# Patient Record
Sex: FEMALE | Race: WHITE | NOT HISPANIC OR LATINO | URBAN - METROPOLITAN AREA
[De-identification: names, ages, dates, MRNs, and addresses within clinical notes are randomized per-mention and may not be internally consistent; named-entity substitution may affect disease eponyms.]

---

## 2017-07-10 ENCOUNTER — OFFICE VISIT - RIVER FALLS (OUTPATIENT)
Dept: FAMILY MEDICINE | Facility: CLINIC | Age: 52
End: 2017-07-10

## 2017-07-10 ASSESSMENT — MIFFLIN-ST. JEOR: SCORE: 1214.81

## 2017-11-20 ENCOUNTER — OFFICE VISIT - RIVER FALLS (OUTPATIENT)
Dept: FAMILY MEDICINE | Facility: CLINIC | Age: 52
End: 2017-11-20

## 2017-11-20 ASSESSMENT — MIFFLIN-ST. JEOR: SCORE: 1285.57

## 2019-06-19 ENCOUNTER — OFFICE VISIT - RIVER FALLS (OUTPATIENT)
Dept: FAMILY MEDICINE | Facility: CLINIC | Age: 54
End: 2019-06-19

## 2019-06-19 LAB
ALBUMIN UR-MCNC: NEGATIVE G/DL
BILIRUB UR QL STRIP: NEGATIVE
GLUCOSE UR STRIP-MCNC: NEGATIVE MG/DL
HGB UR QL STRIP: NEGATIVE
KETONES UR STRIP-MCNC: NEGATIVE MG/DL
LEUKOCYTE ESTERASE UR QL STRIP: NEGATIVE
NITRATE UR QL: NEGATIVE
PH UR STRIP: 7 [PH] (ref 5–8)
SP GR UR STRIP: 1.01 (ref 1–1.03)

## 2019-06-19 ASSESSMENT — MIFFLIN-ST. JEOR: SCORE: 1282.11

## 2020-08-11 ENCOUNTER — OFFICE VISIT - RIVER FALLS (OUTPATIENT)
Dept: FAMILY MEDICINE | Facility: CLINIC | Age: 55
End: 2020-08-11

## 2020-08-11 ENCOUNTER — COMMUNICATION - RIVER FALLS (OUTPATIENT)
Dept: FAMILY MEDICINE | Facility: CLINIC | Age: 55
End: 2020-08-11

## 2020-08-11 ASSESSMENT — MIFFLIN-ST. JEOR: SCORE: 1209.82

## 2020-08-12 ENCOUNTER — COMMUNICATION - RIVER FALLS (OUTPATIENT)
Dept: FAMILY MEDICINE | Facility: CLINIC | Age: 55
End: 2020-08-12

## 2020-08-12 LAB
CHOLEST SERPL-MCNC: 212 MG/DL
CHOLEST/HDLC SERPL: 3.2 {RATIO}
HDLC SERPL-MCNC: 67 MG/DL
LDLC SERPL CALC-MCNC: 127 MG/DL
NONHDLC SERPL-MCNC: 145 MG/DL
TRIGL SERPL-MCNC: 83 MG/DL

## 2021-07-05 ENCOUNTER — OFFICE VISIT - RIVER FALLS (OUTPATIENT)
Dept: FAMILY MEDICINE | Facility: CLINIC | Age: 56
End: 2021-07-05

## 2021-07-06 ASSESSMENT — MIFFLIN-ST. JEOR: SCORE: 1143.59

## 2021-07-09 LAB
ALBUMIN UR-MCNC: NEGATIVE G/DL
APPEARANCE UR: CLEAR
BILIRUB UR QL STRIP: NEGATIVE
COLOR UR AUTO: YELLOW
GLUCOSE UR STRIP-MCNC: NEGATIVE MG/DL
HGB UR QL STRIP: NEGATIVE
KETONES UR STRIP-MCNC: NEGATIVE MG/DL
LEUKOCYTE ESTERASE UR QL STRIP: NEGATIVE
NITRATE UR QL: NEGATIVE
PH UR STRIP: 6.5 [PH]
SP GR UR STRIP: 1.03
UROBILINOGEN UR STRIP-MCNC: NORMAL MG/DL

## 2022-02-11 VITALS
DIASTOLIC BLOOD PRESSURE: 60 MMHG | HEIGHT: 65 IN | WEIGHT: 138.2 LBS | HEART RATE: 68 BPM | SYSTOLIC BLOOD PRESSURE: 106 MMHG | BODY MASS INDEX: 23.03 KG/M2 | TEMPERATURE: 97.7 F

## 2022-02-11 VITALS
HEIGHT: 64 IN | TEMPERATURE: 97.4 F | RESPIRATION RATE: 16 BRPM | WEIGHT: 126 LBS | SYSTOLIC BLOOD PRESSURE: 118 MMHG | DIASTOLIC BLOOD PRESSURE: 68 MMHG | BODY MASS INDEX: 21.51 KG/M2 | HEART RATE: 60 BPM

## 2022-02-11 VITALS
HEART RATE: 54 BPM | OXYGEN SATURATION: 97 % | HEIGHT: 64 IN | TEMPERATURE: 97.3 F | DIASTOLIC BLOOD PRESSURE: 72 MMHG | WEIGHT: 140.6 LBS | SYSTOLIC BLOOD PRESSURE: 122 MMHG | BODY MASS INDEX: 24.01 KG/M2

## 2022-02-11 VITALS
OXYGEN SATURATION: 96 % | WEIGHT: 153.04 LBS | HEART RATE: 65 BPM | SYSTOLIC BLOOD PRESSURE: 120 MMHG | HEIGHT: 65 IN | DIASTOLIC BLOOD PRESSURE: 72 MMHG | BODY MASS INDEX: 25.5 KG/M2 | TEMPERATURE: 97.2 F

## 2022-02-11 VITALS
DIASTOLIC BLOOD PRESSURE: 82 MMHG | TEMPERATURE: 97.1 F | SYSTOLIC BLOOD PRESSURE: 121 MMHG | HEIGHT: 65 IN | HEART RATE: 60 BPM | WEIGHT: 153.8 LBS | BODY MASS INDEX: 25.62 KG/M2

## 2022-02-15 NOTE — LETTER
(Inserted Image. Unable to display)   August 12, 2020      VERONICA HERMILA      [address]  [place], [place] [id number]        Dear VERONICA,    Thank you for selecting [facility] for your healthcare needs.  Below you will find the results of the recent tests done at our clinic.      Your lab results are excellent, Tressa. Cholesterol and other lipid levels help determine your risk for developing heart and blood vessel disease.  A normal adult Total Cholesterol should be under 200, the LDL (bad cholesterol) should be under 130 and is most influenced by dietary intake of cholesterol.   The HDL (good cholesterol) should be over 45 can be influenced by exercise.  The triglyceride level is another type of fat and should be under 150.  Try to avoid saturated fats and trans fats in your diet. So nice to see you.         Result Name Current Result Reference Range   Cholesterol (mg/dL) ((H)) 212 8/11/2020  - <200   Non-HDL Cholesterol ((H)) 145 8/11/2020  - <130   HDL (mg/dL)  67 8/11/2020 > OR = 50 -    Cholesterol/HDL Ratio  3.2 8/11/2020  - <5.0   LDL ((H)) 127 8/11/2020    Triglyceride (mg/dL)  83 8/11/2020  - <150       Please contact me or my assistant at [phone] if you have any questions or concerns.     Sincerely,        COTY Kaye-NP  Family Nurse Practitioner      What do your labs mean?  Below is a glossary of commonly ordered labs:  LDL   Bad Cholesterol   HDL   Good Cholesterol  AST/ALT   Liver Function   Cr/Creatinine   Kidney Function  Microalbumin   Kidney Function  BUN   Kidney Function  PSA   Prostate    TSH   Thyroid Hormone  HgbA1c   Diabetes Test   Hgb (Hemoglobin)   Red Blood Cells  WBC   White Blood Cell Count

## 2022-02-15 NOTE — PROGRESS NOTES
Patient:   VERONICA CLEANING            MRN: [id number]            FIN: [id number]               Age:   56 years     Sex:  Female     :  1965   Associated Diagnoses:   Encounter for examination required by Department of Transportation (DOT)   Author:   Urbano ZAMORA, Colt RIVERS      Chief Complaint   2021 11:37 AM CDT    Pt here for a DOT Px.        Subjective   Chief complaint 2021 11:37 AM CDT    Pt here for a DOT Px.   DOT exam.     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 [employer]      Health Status   Allergies:    Allergic Reactions (Selected)  No Known Medication Allergies   Medications:  (Selected)   , not on any regular medications   Problem list:    All Problems  Tobacco abuse / ICD-9-.1 / Confirmed      Objective         Vital Signs   2021 11:37 AM CDT Temperature Tympanic 97.4 DegF  LOW    Peripheral Pulse Rate 60 bpm    Pulse Site Radial artery    Respiratory Rate 16 br/min    Systolic Blood Pressure 118 mmHg    Diastolic Blood Pressure 68 mmHg    Mean Arterial Pressure 85 mmHg    BP Site Right arm      Measurements from flowsheet : Measurements   2021 11:37 AM CDT Height Measured - Standard 63.5 in    Weight Measured - Standard 126 lb    BSA 1.6 m2    Body Mass Index 21.97 kg/m2      General:  No acute distress.    Eye:  Pupils are equal, round and reactive to light, Extraocular movements are intact, visual acuity is normal, horizontal field of vision is 90 degrees bilaterally.    HENT:  Tympanic membranes are clear, Normal hearing, No pharyngeal erythema, No sinus tenderness.    Neck:  Supple, Non-tender, No lymphadenopathy.    Respiratory:  Lungs are clear to auscultation.    Cardiovascular:  Normal rate, Regular rhythm, No murmur.    Gastrointestinal:  Soft, Non-tender, Non-distended, Normal bowel sounds, No organomegaly.    Musculoskeletal:  Normal range of motion.    Neurologic:  Cranial Nerves II-XII are grossly intact, Normal deep tendon reflexes, Romberg  is negative.    Psychiatric:  Appropriate mood & affect.       Results Review   Results review   Urinalysis is normal      Impression and Plan   Assessment and Plan:          Diagnosis: Encounter for examination required by Department of Transportation (DOT) (CCA99-QW Z02.89).    Orders     Orders   Charges:  9939D DOT Exam (Charge) (Order): Quantity: 1, Encounter for examination required by Department of Transportation (DOT).     Normal DOT exam; Approved for 2 years without restrictions.     .

## 2022-02-15 NOTE — NURSING NOTE
Comprehensive Intake Entered On:  8/11/2020 8:42 AM CDT    Performed On:  8/11/2020 8:35 AM CDT by Madonna Escobar LPN               Summary   Chief Complaint :   pre-op exam; having breast implants removed and then having a breast lift with Dr Sylvie Araiza on 8/28/20, fax# [phone]   Weight Measured :   140.6 lb(Converted to: 140 lb 10 oz, 63.78 kg)    Height Measured :   63.5 in(Converted to: 5 ft 3 in, 161.29 cm)    Body Mass Index :   24.51 kg/m2   Body Surface Area :   1.69 m2   Systolic Blood Pressure :   122 mmHg   Diastolic Blood Pressure :   72 mmHg   Mean Arterial Pressure :   89 mmHg   Peripheral Pulse Rate :   54 bpm (LOW)    BP Site :   Right arm   BP Method :   Manual   Temperature Tympanic :   97.3 DegF(Converted to: 36.3 DegC)  (LOW)    Oxygen Saturation :   97 %   Madonna Escobar LPN - 8/11/2020 8:35 AM CDT   Health Status   Allergies Verified? :   Yes   Medication History Verified? :   Yes   Medical History Verified? :   No   Pre-Visit Planning Status :   Completed   Tobacco Use? :   Former smoker   Madonna Escobar LPN - 8/11/2020 8:35 AM CDT   Meds / Allergies   (As Of: 8/11/2020 8:42:34 AM CDT)   Allergies (Active)   No Known Medication Allergies  Estimated Onset Date:   Unspecified ; Created By:   Madonna Escobar LPN; Reaction Status:   Active ; Category:   Drug ; Substance:   No Known Medication Allergies ; Type:   Allergy ; Updated By:   Madonna Escobar LPN; Reviewed Date:   8/11/2020 8:39 AM CDT        Medication List   (As Of: 8/11/2020 8:42:34 AM CDT)        ID Risk Screen   Recent Travel History :   No recent travel   Family Member Travel History :   No recent travel   Other Exposure to Infectious Disease :   Unknown   Madonna Escobar LPN - 8/11/2020 8:35 AM CDT

## 2022-02-15 NOTE — LETTER
(Inserted Image. Unable to display)   August 11, 2020      VERONICA HERMILA      [address]  [place], [place] [id number]        Dear VERONICA,    Thank you for selecting [facility] for your healthcare needs.  Below you will find the results of the recent tests done at our clinic.      Hemoglobin is excellent, Tressa, the cholesterol test will be back at the end of the week, thank you.      Result Name Current Result Reference Range   Hgb (g/dL)  13.2 8/11/2020 12.0 - 16.0   MCV (fL)  93 8/11/2020 80 - 100       Please contact me or my assistant at [phone] if you have any questions or concerns.     Sincerely,        COTY Kaye-NP  Family Nurse Practitioner      What do your labs mean?  Below is a glossary of commonly ordered labs:  LDL   Bad Cholesterol   HDL   Good Cholesterol  AST/ALT   Liver Function   Cr/Creatinine   Kidney Function  Microalbumin   Kidney Function  BUN   Kidney Function  PSA   Prostate    TSH   Thyroid Hormone  HgbA1c   Diabetes Test   Hgb (Hemoglobin)   Red Blood Cells  WBC   White Blood Cell Count

## 2022-02-15 NOTE — NURSING NOTE
Urine Dipstick POC Entered On:  7/9/2021 12:14 PM CDT    Performed On:  7/6/2021 12:13 PM CDT by Marleny Drew               Urine Dipstick POC   Urine Color Urine Dipstick :   Yellow   Urine Appearance Urine Dipstick :   Clear   Glucose Urine Dipstick :   Negative   Bilirubin Urine Dipstick :   Negative   Ketones Urine Dipstick :   Negative   Specific Gravity Urine Dipstick :   1.030   Blood Urine Dipstick :   Negative   pH Urine Dipstick :   6.5   Protein Urine Dipstick :   Negative   Urobilinogen Urine Dipstick :   0.2 mg/dl   Nitrite Urine Dipstick :   Negative   Leukocytes Urine Dipstick :   Negative   POC Test Comments :   Performed at [facility]   Marleny Drew  7/9/2021 12:13 PM CDT

## 2022-02-15 NOTE — PROGRESS NOTES
Patient:   VERONICA CLEANING            MRN: [id number]            FIN: [id number]               Age:   55 years     Sex:  Female     :  1965   Associated Diagnoses:   Pre-op exam; Presence of breast implant; Screening for lipid disorders   Author:   Linda Muñiz      Preoperative Information   Here for preop history and physical      Chief Complaint   2020 8:35 AM CDT    pre-op exam; having breast implants removed and then having a breast lift with Dr Sylvie Araiza on 20, fax# [phone]     here for pre op, has done well with limited surgeries in past  avoid preventative care due to time contraints but agrees to lipid panel today and mom has 'bad heart'  denies any history of mammogram, plans to have one after implants removed  she has COVID19 testing scheduled for  elsewhere      Review of Systems   Constitutional:  Negative.    Weight has been stable, no nutritional concerns  No Allergies to latex, iodine, contrast dye, shell fish or local anesthetics   Eye:  Negative.    Ear/Nose/Mouth/Throat:  Negative.    Respiratory:  Negative, has quit smoking, was smoking only 2-3 cig per day.    No history of sleep apnea   Cardiovascular:  Negative.    Gastrointestinal:  Negative.    Genitourinary:  Negative, post menopausal 'for years'.    Pregnancy ruled out-post menopausal   Hematology/Lymphatics:  Negative.    Endocrine:  Negative.    Immunologic:  Negative.    Musculoskeletal:  Negative, some limitations with great toe ROM.    Integumentary:  Negative.    Neurologic:  Negative.    Psychiatric:  Negative.    All other systems reviewed and negative   Has no history of anemia.  Has  no history of DVT or pulmonary embolism.  Has  no personal history of bleeding problems.   Has  no personal or family history of anesthesia reactions.  Patient does not have active tuberculosis.    S/he  has not taken aspirin or aspirin containing products in the last week.     S/he  has not taken Plavix  (Clopidogrel) in the last 2 weeks.    S/he  has not taken warfarin in the past week.    S/he  has not been on corticosteroids for more than 2 weeks recently.      S/he  is not DNR before, during or after surgery.    Chest pain / SOB walking up 2 flights of steps? No  Pain in neck or jaw? No  CAD MI?  NO  Afib? NO  Heart Failure? No  Asthma  or Bronchitis? NO  Diabetes? NO       Seizure Disorder? No  CKD? No  Thyroid Disease? No  Liver Disease No  CVA? No           Health Status   Allergies:    Allergic Reactions (Selected)  No Known Medication Allergies   Problem list:    All Problems  Tobacco abuse / ICD-9-.1 / Confirmed   Medications:  (Selected)         Histories   Past Medical History:    Active  Tobacco abuse (305.1)   Family History:    Lupus  Sister  Ruptured cerebral aneurysm  Father ()  MI - Myocardial infarction  Mother  Comments:  2011 7:43 AM PASTOR - Yasmin Wood  First MI in her 40s.  CAD - Coronary artery disease  Mother     Procedure history:    Joint of great toe (SNOMED CT 4952042003) on 2017 at 51 Years.  Comments:  2020 9:01 AM PASTOR - Linda Muñiz  joint replacement left great toe  Breast augmentation (SNOMED CT 1319548378).  Childbirth (SNOMED CT 8407543441).  Comments:  2011 7:47 AM PASTOR - Yasmin Wood  G2, P1, TA1   Social History:        Alcohol Assessment: Current                     Comments:                      2011 aYsmin Resendiz.      Tobacco Assessment: Past      Employment and Education Assessment            Employed, Work/School description: self-employed.      Home and Environment Assessment            Marital status: .                     Comments:                      2011 - Yasmin Wood                      - José Manuel.      Exercise and Physical Activity Assessment: Regular exercise      Sexual Assessment: No Sexual Activity  ,        Alcohol Assessment: Current                     Comments:                       07/13/2011 - Yasmin Wood                     Social.      Tobacco Assessment: Past      Employment and Education Assessment            Employed, Work/School description: self-employed.      Home and Environment Assessment            Marital status: .                     Comments:                      07/13/2011 - Yasmin Wood                      - José Manuel.      Exercise and Physical Activity Assessment: Regular exercise      Sexual Assessment: No Sexual Activity        Physical Examination   Vital Signs   8/11/2020 8:35 AM CDT Temperature Tympanic 97.3 DegF  LOW    Peripheral Pulse Rate 54 bpm  LOW    Systolic Blood Pressure 122 mmHg    Diastolic Blood Pressure 72 mmHg    Mean Arterial Pressure 89 mmHg    BP Site Right arm    BP Method Manual    Oxygen Saturation 97 %      Measurements from flowsheet : Measurements   8/11/2020 8:35 AM CDT Height Measured - Standard 63.5 in    Weight Measured - Standard 140.6 lb    BSA 1.69 m2    Body Mass Index 24.51 kg/m2      General:  Alert and oriented, No acute distress.    Eye:  Normal conjunctiva.    HENT:  Normocephalic, Tympanic membranes are clear, Oral mucosa is moist, No pharyngeal erythema, Mallampati Score 1, no loose teeth.    Neck:  Supple, Non-tender, No carotid bruit, No jugular venous distention, No lymphadenopathy, No thyromegaly.    Respiratory:  Breath sounds are equal, Symmetrical chest wall expansion.         Respirations: Are within normal limits.         Pattern: Regular.         Breath sounds: Bilateral, Within normal limits.    Cardiovascular:  Normal rate, Regular rhythm, No murmur, Good pulses equal in all extremities, Normal peripheral perfusion, No edema.    Gastrointestinal:  Soft, Non-tender, Non-distended.    Musculoskeletal:  Normal range of motion, Normal strength, Normal gait.    Integumentary:  Warm, Dry, Intact, No rash.    Neurologic:  Alert, Oriented, Normal motor function, No focal deficits.     Psychiatric:  Cooperative, Appropriate mood & affect.       Review / Management   Results review:  Lab results   8/11/2020 9:44 AM CDT Hgb 13.2 g/dL    MCV 93 fL   .         Interpretation: Labs unremarkable.    ECG interpretation:  Time  8/11/2020 1:23:00 PM, sinus isaias.       Impression and Plan   Diagnosis     Pre-op exam (QHX96-RB Z01.818).     Presence of breast implant (WXF57-YP Z98.82).     Screening for lipid disorders (ICQ26-KU Z13.220).     Condition:  Stable, cleared to Procede with procedure/surgery..    Orders     No SBE prophylaxis or DVT prophylaxis necessary .     Informed patient to avoid aspirin and ibuprofen type products 10 days prior to surgery  keep appt for COVID19 testing  she has stopped smoking and I supported this!  Tdap today  encouraged rtc for pap screening and consider colon cancer screening.

## 2022-02-15 NOTE — NURSING NOTE
Comprehensive Intake Entered On:  7/6/2021 11:46 AM CDT    Performed On:  7/6/2021 11:37 AM CDT by Ermias Álvarez CMA               Summary   Chief Complaint :   Pt here for a DOT Px.   Weight Measured :   126 lb(Converted to: 126 lb 0 oz, 57.153 kg)    Height Measured :   63.5 in(Converted to: 5 ft 3 in, 161.29 cm)    Body Mass Index :   21.97 kg/m2   Body Surface Area :   1.6 m2   Systolic Blood Pressure :   118 mmHg   Diastolic Blood Pressure :   68 mmHg   Mean Arterial Pressure :   85 mmHg   Peripheral Pulse Rate :   60 bpm   BP Site :   Right arm   Pulse Site :   Radial artery   Temperature Tympanic :   97.4 DegF(Converted to: 36.3 DegC)  (LOW)    Respiratory Rate :   16 br/min   Ermias Álvarez CMA - 7/6/2021 11:37 AM CDT   Health Status   Allergies Verified? :   Yes   Medication History Verified? :   Yes   Medical History Verified? :   Yes   Pre-Visit Planning Status :   Not completed   Tobacco Use? :   Former smoker   Ermias Álvarez CMA - 7/6/2021 11:37 AM CDT   Meds / Allergies   (As Of: 7/6/2021 11:46:35 AM CDT)   Allergies (Active)   No Known Medication Allergies  Estimated Onset Date:   Unspecified ; Created By:   Madonna Escobar LPN; Reaction Status:   Active ; Category:   Drug ; Substance:   No Known Medication Allergies ; Type:   Allergy ; Updated By:   Madonna Escobar LPN; Reviewed Date:   7/6/2021 11:41 AM CDT        Medication List   (As Of: 7/6/2021 11:46:35 AM CDT)        Vision Testing POC   Corrective Lenses :   None   Eye, Left Visual Acuity :   20/20   Eye, Right Visual Acuity :   20/20   Eye, Bilateral Visual Acuity :   20/20   Ermias Álvarez CMA - 7/6/2021 11:37 AM CDT   Procedures / Surgeries        -    Procedure History   (As Of: 7/6/2021 11:46:35 AM CDT)     Anesthesia Minutes:   0 ; Procedure Name:   Childbirth ; Procedure Minutes:   0 ; Comments:     7/13/2011 7:47 AM CDT - Yasmin Wood  G2, P1, TA1            Anesthesia Minutes:   0 ; Procedure Name:   Breast augmentation ;  Procedure Minutes:   0            Procedure Dt/Tm:   2017 ; Anesthesia Minutes:   0 ; Procedure Name:   H/O: surgery ; Procedure Minutes:   0 ; Comments:     8/11/2020 12:46 PM CDT - Luz THORPE, Madonna  left foot; joint replacement            Procedure Dt/Tm:   1/1/2017 ; Anesthesia Minutes:   0 ; Procedure Name:   Joint of great toe ; Procedure Minutes:   0 ; Comments:     8/11/2020 9:01 AM CDT - Jeff NP-C, Linda  joint replacement left great toe            Social History   Social History   (As Of: 7/6/2021 11:46:35 AM CDT)   Alcohol:  Current       Comments:  7/13/2011 7:44 AM - Yasmin Wood: Social.   (Last Updated: 7/13/2011 7:44:27 AM CDT by Yasmin Wood)          Tobacco:  Past      Former smoker, quit more than 30 days ago   (Last Updated: 7/6/2021 11:38:08 AM CDT by Ermias Álvarez CMA)          Electronic Cigarette/Vaping:        Electronic Cigarette Use: Never.   (Last Updated: 7/6/2021 11:38:11 AM CDT by Ermias Álvarez CMA)          Employment/School:        Employed, Work/School description: self-employed.   (Last Updated: 6/28/2010 2:27:43 PM CDT by Denzel Adams MD)          Home/Environment:        Marital status: .   Comments:  7/13/2011 7:48 AM - Yasmin Wood:  - José Manuel.   (Last Updated: 7/13/2011 7:48:10 AM CDT by Yasmin Wood)          Exercise:  Regular exercise      (Last Updated: 6/28/2010 2:27:49 PM CDT by Denzel Adams MD )         Sexual:  No Sexual Activity      (Last Updated: 6/28/2010 2:27:53 PM CDT by Denzel Adams MD )

## 2022-02-15 NOTE — PROGRESS NOTES
Patient:   VERONICA CLEANING            MRN: [id number]            FIN: [id number]               Age:   52 years     Sex:  Female     :  1965   Associated Diagnoses:   's permit physical examination   Author:   Linda Muñiz      Visit Information      Date of Service: 07/10/2017 12:10 pm  Performing Location: [facility]  Encounter#: [id number]      Primary Care Provider (PCP):  Gabe Paige MD    NPI# [id number]      Referring Provider:  Linda Muñiz    NPI# [id number]      Chief Complaint   7/10/2017 12:15 PM CDT   DOT px        History of Present Illness   here for DOT physical, please see scanned history/Physical exam      Review of Systems            Health Status   Allergies:    Allergic Reactions (Selected)  No known allergies   Medications:  (Selected)   Documented Medications  Documented  Calcium 600+D: po, tid, 0 Refill(s), Type: Maintenance  Fish Oil oral capsule: 0 Refill(s), Type: Maintenance  aspirin 81 mg oral tablet: 1 tab(s) ( 81 mg ), po, daily, tab(s), 0 Refill(s), Type: Maintenance   Problem list:    All Problems  Tobacco abuse / ICD-9-.1 / Confirmed      Histories   Past Medical History:    Active  Tobacco abuse (305.1)   Family History:    Lupus  Sister  Ruptured cerebral aneurysm  Father ()  MI - Myocardial infarction  Mother  Comments:  2011 7:43 Yasmin Frank  First MI in her 40s.  CAD - Coronary artery disease  Mother     Procedure history:    Breast augmentation (SNOMED CT 2372925589).  Childbirth (SNOMED CT 6847488201).  Comments:  2011 7:47 Yasmin Frank  G2, P1, TA1   Social History:        Alcohol Assessment: Current                     Comments:                      2011 - Yasmin Wood.      Tobacco Assessment: Past      Employment and Education Assessment            Employed, Work/School description: self-employed.      Home and Environment Assessment            Marital status:  .                     Comments:                      07/13/2011 - Yasmin Wood                      - José Manuel.      Exercise and Physical Activity Assessment: Regular exercise      Sexual Assessment: No Sexual Activity        Physical Examination   Vital Signs   7/10/2017 12:15 PM CDT Temperature Tympanic 97.7 DegF  LOW    Peripheral Pulse Rate 68 bpm    Pulse Site Radial artery    HR Method Manual    Systolic Blood Pressure 106 mmHg    Diastolic Blood Pressure 60 mmHg    Mean Arterial Pressure 75 mmHg    BP Site Right arm    BP Method Manual      Measurements from flowsheet : Measurements   7/10/2017 12:15 PM CDT Height Measured - Standard 64.5 in    Weight Measured - Standard 138.2 lb    BSA 1.69 m2    Body Mass Index 23.35 kg/m2      General:  Alert and oriented.    Eye:  Pupils are equal, round and reactive to light, Extraocular movements are intact, Normal conjunctiva, vision and hearing exam acceptable to A[employer] standards.    HENT:  Tympanic membranes are clear, Oral mucosa is moist, No pharyngeal erythema, No sinus tenderness.    Neck:  Supple, Non-tender, No lymphadenopathy, No thyromegaly, adequate ROM.    Respiratory:  Lungs are clear to auscultation, Respirations are non-labored, Breath sounds are equal, Symmetrical chest wall expansion, No chest wall tenderness.    Cardiovascular:  Normal rate, Regular rhythm, No murmur, No gallop, Normal peripheral perfusion.    Gastrointestinal:  Soft, Non-tender, Non-distended, No organomegaly.    Genitourinary:  No costovertebral angle tenderness, no evidence of hernia.    Musculoskeletal:  Normal range of motion, Normal strength, No tenderness, No swelling, No deformity, Normal gait.    Integumentary:  Warm, Dry, Pink, No rash.    Neurologic:  Alert, Oriented, Normal sensory, Normal motor function, No focal deficits, Cranial Nerves II-XII are grossly intact, Normal deep tendon reflexes.    Psychiatric:  Cooperative, Appropriate mood & affect, Normal  judgment, Non-suicidal.       Impression and Plan   Diagnosis     's permit physical examination (DME80-OR Z02.4).     Patient Instructions:       Counseled: Patient, Regarding diagnosis, Regarding treatment, Regarding medications, Verbalized understanding.    Orders     See scanned document for specifics regarding DOT clearance.

## 2022-02-15 NOTE — PROGRESS NOTES
Patient:   VERONICA CLEANING            MRN: [id number]            FIN: [id number]               Age:   52 years     Sex:  Female     :  1965   Associated Diagnoses:   Pre-op exam; Left foot pain   Author:   Linda Muñiz      Preoperative Information   Dr. Ashford   requested consult for preoperative history and physical for      Chief Complaint   2017 3:00 PM CST   Pre op big toe of L foot (bone spur) dos 17 @[facility] Dr. COLEEN Ashford.     Patient presents for pre-op physical for bones spur removal on left foot. Reports left foot pain for years but didn't know what was causing it.  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  States she is now convinced how bad her foot it and wants it fixed.  Patient reports she is healthy and takes no prescription medication.  Denies snoring, breathing problems and sleep apnea.  Smokes 3 cigarettes a day, denies cough.        Review of Systems   Constitutional:  Negative.    Eye:  Negative.    Ear/Nose/Mouth/Throat:  Negative.    Respiratory:  Negative.    Cardiovascular:  Negative.    Gastrointestinal:  Negative.    Genitourinary:  Negative.    Hematology/Lymphatics:  Negative.    Endocrine:  Negative.    Immunologic:  Negative.    Musculoskeletal:  left foot bone spur.    Integumentary:  Negative.    Neurologic:  Negative.    Psychiatric:  Negative.    All other systems reviewed and negative      Health Status   Allergies:    Allergic Reactions (Selected)  No known allergies   Problem list:    All Problems  Tobacco abuse / ICD-9-.1 / Confirmed   Medications:  (Selected)   Documented Medications  Documented  Celebrate Multivitamin: See Instructions, Instructions: one gummy daily, 0 Refill(s), Type: Maintenance      Histories   Past Medical History:    Active  Tobacco abuse (305.1)   Family History:    Lupus  Sister  Ruptured cerebral aneurysm  Father ()  MI - Myocardial  infarction  Mother  Comments:  7/13/2011 7:43 AM - Israel Yasmin  First MI in her 40s.  CAD - Coronary artery disease  Mother     Procedure history:    Breast augmentation ([id number]).  Childbirth ([id number]).  Comments:  7/13/2011 7:47 AM Yasmin Resendiz  G2, P1, TA1   Social History:        Alcohol Assessment: Current                     Comments:                      07/13/2011 - Yasmin Wood.      Tobacco Assessment: Past      Employment and Education Assessment            Employed, Work/School description: self-employed.      Home and Environment Assessment            Marital status: .                     Comments:                      07/13/2011 - Yasmin Wood                      - José Manuel.      Exercise and Physical Activity Assessment: Regular exercise      Sexual Assessment: No Sexual Activity  ,        Alcohol Assessment: Current                     Comments:                      07/13/2011 - Yasmin Wood.      Tobacco Assessment: Past      Employment and Education Assessment            Employed, Work/School description: self-employed.      Home and Environment Assessment            Marital status: .                     Comments:                      07/13/2011 - Yasmin Wood                      - José Manuel.      Exercise and Physical Activity Assessment: Regular exercise      Sexual Assessment: No Sexual Activity        Physical Examination   Vital Signs   11/20/2017 3:00 PM CST Temperature Tympanic 97.1 DegF  LOW    Peripheral Pulse Rate 60 bpm    Pulse Site Radial artery    HR Method Manual    Systolic Blood Pressure 121 mmHg    Diastolic Blood Pressure 82 mmHg    Mean Arterial Pressure 95 mmHg    BP Site Right arm    BP Method Manual      Measurements from flowsheet : Measurements   11/20/2017 3:00 PM CST Height Measured - Standard 64.5 in    Weight Measured - Standard 153.8 lb    BSA 1.78 m2    Body Mass Index 25.99  kg/m2      General:  Alert and oriented, No acute distress.    Eye:  Pupils are equal, round and reactive to light, Normal conjunctiva, Normal conjunctiva.    HENT:  Normocephalic, Tympanic membranes are clear, Oral mucosa is moist, No pharyngeal erythema, Mallampati score II.    Neck:  Supple, Non-tender, No jugular venous distention, No lymphadenopathy, No thyromegaly.    Respiratory:  Breath sounds are equal, Symmetrical chest wall expansion.         Respirations: Are within normal limits.         Pattern: Regular.         Breath sounds: Bilateral, Within normal limits.    Cardiovascular:  Regular rhythm, No murmur, Normal peripheral perfusion, No edema.    Musculoskeletal:  Normal range of motion, Normal strength, Normal gait.    Integumentary:  Warm, Dry, Intact, No rash.    Neurologic:  Alert, Oriented, Normal sensory, Normal motor function.    Psychiatric:  Cooperative, Appropriate mood & affect, Normal judgment.       Review / Management   No family history of bleeding tendencies, thrombophilias, or anesthesia complications.  No personal history of bleeding tendencies, thrombophilias, anesthesia complications, or valvular disease.   Results review:  All Results   11/20/2017 3:58 PM CST WBC 11.2    RBC 4.08    Hgb 12.9 g/dL    Hct 37.2 %    MCV 91 fL    MCH 31.6 pg    MCHC 34.7 g/dL    RDW 11.8 %    Platelet 308    MPV 10.4 fL    Lymphocytes 35.6 %    Abs Lymphocytes 4.0    Neutrophils 56.8 %    Abs Neutrophils 6.4    Monocytes 6.1 %    Abs Monocytes 0.7    Eosinophils 1.3 %    Abs Eosinophils 0.2    Basophils 0.2 %    Abs Basophils 0.0   .    ECG interpretation:  Within normal limits, Sinus bradycardia.       Impression and Plan   Diagnosis     Pre-op exam (KOV15-OQ Z01.818).     Left foot pain (GFB09-CS M79.672).     Condition:  Stable, Procede with procedure/surgery. .    Orders     No SBE prophylaxis or DVT prophylaxis necessary.     Informed patient to avoid aspirin and ibuprofen type products 10 days  prior to surgery.

## 2022-02-15 NOTE — PROGRESS NOTES
Patient:   VERONICA CLEANING            MRN: [id number]            FIN: [id number]               Age:   52 years     Sex:  Female     :  1965   Associated Diagnoses:   None   Author:   Yousuf Davis MD      Procedure   EKG procedure   Date:  2017.     EKG findings   Interpretation: Yousuf Davis MD.     Interpretation: normal EKG.

## 2022-02-15 NOTE — PROGRESS NOTES
Patient:   VERONICA CLEANING            MRN: [id number]            FIN: [id number]               Age:   54 years     Sex:  Female     :  1965   Associated Diagnoses:   Encounter for examination required by Department of Transportation (DOT)   Author:   Linda Muñiz      Chief Complaint   2019 9:24 AM CDT    DOT px        History of Present Illness   here for DOT physical, please see scanned history/Physical exam      Health Status   Allergies:    Allergic Reactions (Selected)  No known allergies   Medications:  (Selected)      Problem list:    All Problems  Tobacco abuse / ICD-9-.1 / Confirmed      Histories   Past Medical History:    Active  Tobacco abuse (305.1)   Family History:    Lupus  Sister  Ruptured cerebral aneurysm  Father ()  MI - Myocardial infarction  Mother  Comments:  2011 7:43 AM CDT - Yasmin Wood  First MI in her 40s.  CAD - Coronary artery disease  Mother     Procedure history:    Breast augmentation (SNOMED CT 3859059065).  Childbirth (SNOMED CT 3318001101).  Comments:  2011 7:47 AM CDT - Yasmin Wood  G2, P1, TA1   Social History:        Alcohol Assessment: Current                     Comments:                      2011 - Yasmin Wood                     Social.      Tobacco Assessment: Past      Employment and Education Assessment            Employed, Work/School description: self-employed.      Home and Environment Assessment            Marital status: .                     Comments:                      2011 - Yasmin Wood                      - José Manuel.      Exercise and Physical Activity Assessment: Regular exercise      Sexual Assessment: No Sexual Activity      Physical Examination   Vital Signs   2019 9:24 AM CDT Temperature Tympanic 97.2 DegF  LOW    Peripheral Pulse Rate 65 bpm    Systolic Blood Pressure 120 mmHg    Diastolic Blood Pressure 72 mmHg    Mean Arterial Pressure 88 mmHg    BP Site Right arm    BP Method  Manual    Oxygen Saturation 96 %      Measurements from flowsheet : Measurements   6/19/2019 9:24 AM CDT Height Measured - Standard 64.5 in    Weight Measured - Standard 153.04 lb    BSA 1.78 m2    Body Mass Index 25.86 kg/m2  HI      General:  Alert and oriented.    Eye:  Pupils are equal, round and reactive to light, Extraocular movements are intact, Normal conjunctiva, vision and hearing exam acceptable to A[employer] standards.    HENT:  Tympanic membranes are clear, Oral mucosa is moist, No pharyngeal erythema, No sinus tenderness.    Neck:  Supple, Non-tender, No lymphadenopathy, No thyromegaly, adequate ROM.    Respiratory:  Lungs are clear to auscultation, Respirations are non-labored, Breath sounds are equal, Symmetrical chest wall expansion, No chest wall tenderness.    Cardiovascular:  Normal rate, Regular rhythm, No murmur, No gallop, Normal peripheral perfusion.    Gastrointestinal:  Soft, Non-tender, Non-distended, No organomegaly.    Genitourinary:  No costovertebral angle tenderness, no evidence of hernia.    Musculoskeletal:  Normal range of motion, Normal strength, No tenderness, No swelling, No deformity, Normal gait.    Integumentary:  Warm, Dry, Pink, No rash.    Neurologic:  Alert, Oriented, Normal sensory, Normal motor function, No focal deficits, Cranial Nerves II-XII are grossly intact, Normal deep tendon reflexes.    Psychiatric:  Cooperative, Appropriate mood & affect, Normal judgment, Non-suicidal.       Review / Management   Results review:  Lab results   6/19/2019 8:52 AM CDT UA pH 7.0    UA Specific Gravity 1.010    UA Glucose NEGATIVE    UA Bilirubin NEGATIVE    UA Ketones NEGATIVE    Urine Occult Blood NEGATIVE    UA Protein NEGATIVE    UA Nitrite NEGATIVE    UA Leukocyte Esterase NEGATIVE   .       Impression and Plan   Diagnosis     Encounter for examination required by Department of Transportation (DOT) (TAJ01-LZ Z02.89).     Patient Instructions:       Counseled: Patient, Regarding  diagnosis, Regarding treatment, Regarding medications, Verbalized understanding.    Orders     See scanned document for specifics regarding DOT clearance.

## 2022-02-15 NOTE — NURSING NOTE
Comprehensive Intake Entered On:  6/19/2019 9:32 AM CDT    Performed On:  6/19/2019 9:24 AM CDT by Madonna Escobar LPN               Summary   Chief Complaint :   DOT px   Weight Measured :   153.04 lb(Converted to: 153 lb 1 oz, 69.42 kg)    Height Measured :   64.5 in(Converted to: 5 ft 4 in, 163.83 cm)    Body Mass Index :   25.86 kg/m2 (HI)    Body Surface Area :   1.78 m2   Systolic Blood Pressure :   120 mmHg   Diastolic Blood Pressure :   72 mmHg   Mean Arterial Pressure :   88 mmHg   Peripheral Pulse Rate :   65 bpm   BP Site :   Right arm   BP Method :   Manual   Temperature Tympanic :   97.2 DegF(Converted to: 36.2 DegC)  (LOW)    Oxygen Saturation :   96 %   Madonna Escobar LPN - 6/19/2019 9:24 AM CDT   Health Status   Allergies Verified? :   Yes   Medication History Verified? :   Yes   Medical History Verified? :   Yes   Pre-Visit Planning Status :   Completed   Tobacco Use? :   Current every day smoker   Tobacco Cessation Review :   Not ready to quit   Madonna Escobar LPN - 6/19/2019 9:24 AM CDT   Meds / Allergies   (As Of: 6/19/2019 9:32:52 AM CDT)   Allergies (Active)   No known allergies  Estimated Onset Date:   Unspecified ; Created By:   Ramona Mitchell; Reaction Status:   Active ; Category:   Drug ; Substance:   No known allergies ; Type:   Allergy ; Updated By:   Ramona Mitchell; Reviewed Date:   11/20/2017 3:05 PM CST        Medication List   (As Of: 6/19/2019 9:32:52 AM CDT)

## 2022-02-15 NOTE — RESULTS
Patient:   VERONICA CLEANING            MRN: [id number]            FIN: [id number]               Age:   55 years     Sex:  Female     :  1965   Associated Diagnoses:   None   Author:   Mik Mckay MD      Procedure   EKG procedure   Date/ Time:  2020 9:09:00 AM.     EKG findings   Interpretation: Mik Mckay MD.     Rhythm: heart rate  52  beats/min.     Interpretation: sinus bradycardia without acute changes.

## 2022-02-15 NOTE — TELEPHONE ENCOUNTER
---------------------  From: Melonie Lee RN   Sent: 8/18/2020 11:14:08 AM CDT  Subject: pre op      Time of Call:  1019  Return call at:1115     Person Calling:  patient  Phone number:      Note:   She needs pre op faxed to Dr. Araiza. The  tells her it has not been received. I have refaxed it to     Last office visit and reason:  8/11/20 Pre op px

## 2022-02-15 NOTE — NURSING NOTE
Depression Screening Entered On:  8/11/2020 10:41 AM CDT    Performed On:  8/11/2020 10:40 AM CDT by Madonna Escobar LPN               Depression Screening   Little Interest - Pleasure in Activities :   Not at all   Feeling Down, Depressed, Hopeless :   Not at all   Initial Depression Screen Score :   0 Score   Poor Appetite or Overeating :   Not at all   Trouble Falling or Staying Asleep :   Not at all   Feeling Tired or Little Energy :   Not at all   Feeling Bad About Yourself :   Not at all   Trouble Concentrating :   Not at all   Moving or Speaking Slowly :   Not at all   Thoughts Better Off Dead or Hurting Self :   Not at all   TARA Difficulty with Work, Home, Others :   Not difficult at all   Detailed Depression Screen Score :   0    Total Depression Screen Score :   0    Madonna Escobar LPN - 8/11/2020 10:40 AM CDT